# Patient Record
(demographics unavailable — no encounter records)

---

## 2019-06-11 NOTE — EMERGENCY ROOM REPORT
History of Present Illness


General


Chief Complaint:  Abdominal Pain


Source:  Patient, Medical Record





Present Illness


HPI


Patient presents with complaints of left lower abdominal pain some radiation to 

the upper abdominal area reports that he was in a different hospital recently 

with similar pain


It had improved however was unable to get his medications and the pain has now 

returned patient reports that many years ago he had a similar episode with 

nausea vomiting had gone to an initial hospital and after return visit was 

found to have a heart attack 


Denies any dysuria frequency


Patient does feel that he is constipated


Allergies:  


Coded Allergies:  


     IODINE (Verified  Allergy, Unknown, 6/11/19)





Patient History


Past Medical History:  see triage record


Pertinent Family History:  none


Reviewed Nursing Documentation:  PMH: Agreed; PSxH: Agreed





Nursing Documentation-PMH


Past Medical History:  No History, Except For


Hx Cardiac Problems:  Yes - Stent in 2009





Review of Systems


All Other Systems:  negative except mentioned in HPI





Physical Exam





Vital Signs








  Date Time  Temp Pulse Resp B/P (MAP) Pulse Ox O2 Delivery O2 Flow Rate FiO2


 


6/11/19 14:50 97.9 77 20 120/73 (89) 100 Room Air  








Sp02 EP Interpretation:  reviewed, normal


General Appearance:  well appearing, no apparent distress


Head:  normocephalic, atraumatic


Eyes:  bilateral eye PERRL, bilateral eye EOMI


ENT:  hearing grossly normal, normal pharynx, TMs + canals normal, uvula midline


Neck:  full range of motion, supple, no meningismus, no bony tend


Respiratory:  lungs clear, normal breath sounds, no rhonchi, no respiratory 

distress, no retraction, no accessory muscle use


Cardiovascular #1:  normal peripheral pulses, regular rate, rhythm, no edema, 

no gallop, no JVD, no murmur


Gastrointestinal:  normal bowel sounds, non tender - Likely points to the left 

lower abdominal area, soft, no mass, no organomegaly, non-distended, no guarding

, no hernia, no pulsatile mass, no rebound


Genitourinary:  no CVA tenderness


Musculoskeletal:  normal inspection


Neurologic:  oriented x3, responsive, CNs III-XII nml as tested, motor strength/

tone normal, sensory intact


Psychiatric:  mood/affect normal


Skin:  normal color, no rash, warm/dry, palpation normal


Lymphatic:  normal inspection, no adenopathy





Medical Decision Making





Labs








Test


  6/11/19


15:10 6/11/19


16:40


 


White Blood Count


  9.8 K/UL


(4.8-10.8) 


 


 


Red Blood Count


  4.65 M/UL


(4.70-6.10) 


 


 


Hemoglobin


  14.2 G/DL


(14.2-18.0) 


 


 


Hematocrit


  41.4 %


(42.0-52.0) 


 


 


Mean Corpuscular Volume 89 FL (80-99)  


 


Mean Corpuscular Hemoglobin


  30.4 PG


(27.0-31.0) 


 


 


Mean Corpuscular Hemoglobin


Concent 34.2 G/DL


(32.0-36.0) 


 


 


Red Cell Distribution Width


  10.7 %


(11.6-14.8) 


 


 


Platelet Count


  164 K/UL


(150-450) 


 


 


Mean Platelet Volume


  6.4 FL


(6.5-10.1) 


 


 


Neutrophils (%) (Auto)


  83.8 %


(45.0-75.0) 


 


 


Lymphocytes (%) (Auto)


  9.8 %


(20.0-45.0) 


 


 


Monocytes (%) (Auto)


  6.0 %


(1.0-10.0) 


 


 


Eosinophils (%) (Auto)


  0.0 %


(0.0-3.0) 


 


 


Basophils (%) (Auto)


  0.4 %


(0.0-2.0) 


 


 


Sodium Level


  135 MMOL/L


(136-145) 


 


 


Potassium Level


  3.7 MMOL/L


(3.5-5.1) 


 


 


Chloride Level


  100 MMOL/L


() 


 


 


Carbon Dioxide Level


  28 MMOL/L


(21-32) 


 


 


Anion Gap


  7 mmol/L


(5-15) 


 


 


Blood Urea Nitrogen


  18 mg/dL


(7-18) 


 


 


Creatinine


  1.2 MG/DL


(0.55-1.30) 


 


 


Estimat Glomerular Filtration


Rate > 60 mL/min


(>60) 


 


 


Glucose Level


  129 MG/DL


() 


 


 


Calcium Level


  8.5 MG/DL


(8.5-10.1) 


 


 


Total Bilirubin


  0.9 MG/DL


(0.2-1.0) 


 


 


Aspartate Amino Transf


(AST/SGOT) 67 U/L (15-37) 


  


 


 


Alanine Aminotransferase


(ALT/SGPT) 59 U/L (12-78) 


  


 


 


Alkaline Phosphatase


  66 U/L


() 


 


 


Total Protein


  6.8 G/DL


(6.4-8.2) 


 


 


Albumin


  3.7 G/DL


(3.4-5.0) 


 


 


Globulin 3.1 g/dL  


 


Albumin/Globulin Ratio 1.2 (1.0-2.7)  


 


Lipase


  88 U/L


() 


 


 


Urine Color  Pale yellow 


 


Urine Appearance  Clear 


 


Urine pH  6 (4.5-8.0) 


 


Urine Specific Gravity


  


  1.015


(1.005-1.035)


 


Urine Protein  2+ (NEGATIVE) 


 


Urine Glucose (UA)


  


  Negative


(NEGATIVE)


 


Urine Ketones  1+ (NEGATIVE) 


 


Urine Blood  2+ (NEGATIVE) 


 


Urine Nitrite


  


  Negative


(NEGATIVE)


 


Urine Bilirubin


  


  Negative


(NEGATIVE)


 


Urine Urobilinogen


  


  Normal MG/DL


(0.0-1.0)


 


Urine Leukocyte Esterase


  


  Negative


(NEGATIVE)


 


Urine RBC


  


  0-2 /HPF (0 -


0)


 


Urine WBC  0 /HPF (0 - 0) 


 


Urine Squamous Epithelial


Cells 


  Occasional


/LPF


 


Urine Bacteria


  


  Occasional


/HPF (NONE)


 


Urine Mucus


  


  Few /LPF


(NONE/OCC)








CT/MRI/US Diagnostic Results


CT/MRI/US Diagnostic Results :  


   Impression


ct abd pelvis: 


IMPRESSION:


 


No acute findings.


 


Atherosclerotic vascular disease.


 


Mild thickening of the wall the urinary bladder. Correlate for cystitis.





Last Vital Signs








  Date Time  Temp Pulse Resp B/P (MAP) Pulse Ox O2 Delivery O2 Flow Rate FiO2


 


6/11/19 15:36 97.9       


 


6/11/19 14:59  71 16   Room Air  


 


6/11/19 14:59    132/71 100   








Scripts


Docusate Sodium* (COLACE*) 100 Mg Capsule


100 MG ORAL THREE TIMES A DAY, #12 CAP


   Prov: Mary Solano DO         6/11/19 


Famotidine (PEPCID AC) 20 Mg Tablet


20 MG PO DAILY, #12 TAB


   Prov: Mary Solano DO         6/11/19


Referrals:  


[facility] (PCP)











Mary Solano DO Jun 11, 2019 16:18

## 2019-06-11 NOTE — NUR
ED Nurse Note:



PT BROUGHT IN BY AMBULANCE DUE TO LLQ ABD. PAIN SINCE THIS MORNING. PT WAS SEEN 
AT [facility] TODAY AND WAS GIVEN PRESCRIPTIONS. PT STATES HIS LAST BOWLE 
MOVEMENT WAS YESTERDAY AFTERNOON BUT SCANTY. PT IS AAO X4, AMBULATORY. VSS.

## 2019-06-11 NOTE — DIAGNOSTIC IMAGING REPORT
Indication: Abdominal pain

 

Technique: Continuous helical transaxial imaging of the abdomen and pelvis was

obtained from the lung bases to the pubic symphysis. No intravenous contrast was

administered. Coronal 2-D reformats were also obtained. Automatic Exposure Control

was utilized.

 

 

Total Dose length Product (DLP):  508.02 mGycm

 

CT Dose Index Volume (CTDIvol):   9.94 mGy

 

Comparison: none

 

Findings: Bowel gas pattern is nonobstructive. The appendix is normal. No renal

stones or hydronephrosis demonstrated. The gallbladder is unremarkable. There is a

calcification in the liver which is nonspecific. No ascites demonstrated. Prostate

calcification noted. Mild thickening of the urinary bladder wall demonstrated.

 

IMPRESSION:

 

No acute findings.

 

Atherosclerotic vascular disease.

 

Mild thickening of the wall the urinary bladder. Correlate for cystitis.

 

 

 

 

The CT scanner at [facility] is accredited by the [employer] College [employer]

Radiology and the scans are performed using dose optimization techniques as

appropriate to a performed exam including Automatic Exposure control.

## 2019-06-12 NOTE — CARDIOLOGY REPORT
--------------- APPROVED REPORT --------------





EKG Measurement

Heart [id number]

DC [id number]

[id number]

[id number]

[id number]





Normal sinus rhythm

Anteroseptal infarct, age undetermined

Abnormal ECG